# Patient Record
Sex: MALE | Race: WHITE | NOT HISPANIC OR LATINO | Employment: FULL TIME | URBAN - METROPOLITAN AREA
[De-identification: names, ages, dates, MRNs, and addresses within clinical notes are randomized per-mention and may not be internally consistent; named-entity substitution may affect disease eponyms.]

---

## 2018-07-20 ENCOUNTER — OFFICE VISIT (OUTPATIENT)
Dept: URGENT CARE | Facility: CLINIC | Age: 53
End: 2018-07-20
Payer: COMMERCIAL

## 2018-07-20 VITALS
SYSTOLIC BLOOD PRESSURE: 140 MMHG | TEMPERATURE: 97.8 F | HEIGHT: 71 IN | WEIGHT: 192.6 LBS | RESPIRATION RATE: 16 BRPM | DIASTOLIC BLOOD PRESSURE: 84 MMHG | OXYGEN SATURATION: 100 % | HEART RATE: 74 BPM | BODY MASS INDEX: 26.96 KG/M2

## 2018-07-20 DIAGNOSIS — H10.31 ACUTE BACTERIAL CONJUNCTIVITIS OF RIGHT EYE: Primary | ICD-10-CM

## 2018-07-20 PROCEDURE — 99213 OFFICE O/P EST LOW 20 MIN: CPT | Performed by: PHYSICIAN ASSISTANT

## 2018-07-20 RX ORDER — GLIPIZIDE 5 MG/1
TABLET, FILM COATED, EXTENDED RELEASE ORAL
COMMUNITY
Start: 2018-04-18

## 2018-07-20 RX ORDER — MOXIFLOXACIN 5 MG/ML
1 SOLUTION/ DROPS OPHTHALMIC 3 TIMES DAILY
Qty: 3 ML | Refills: 0 | Status: SHIPPED | OUTPATIENT
Start: 2018-07-20 | End: 2018-07-27

## 2018-07-20 RX ORDER — SITAGLIPTIN AND METFORMIN HYDROCHLORIDE 100; 1000 MG/1; MG/1
TABLET, FILM COATED, EXTENDED RELEASE ORAL
COMMUNITY
Start: 2018-07-18

## 2018-07-20 NOTE — PATIENT INSTRUCTIONS
Use eyedrops as directed  Avoid touching your eyes  You may apply a cool compress to your eye for discomfort  Wash your hands frequently  Follow-up with your family doctor in 3-5 days  Proceed to the ER if symptoms worsen  Conjunctivitis   WHAT YOU SHOULD KNOW:   Conjunctivitis, or pink eye, is inflammation of your conjunctiva  The conjunctiva is a thin tissue that covers the front of your eye and the back of your eyelids  The conjunctiva helps protect your eye and keep it moist         INSTRUCTIONS:   Medicines:   · Allergy medicine: This medicine helps decrease itchy, red, swollen eyes caused by allergies  It may be given as a pill, eye drops, or nasal spray  · Antibiotics:  You will need antibiotics if your conjunctivitis is caused by bacteria  This medicine may be given as eye drops or eye ointment  · Steroid medicine: This medicine helps decrease inflammation  It may be given as a pill, eye drops, or nasal spray  · Take your medicine as directed  Call your healthcare provider if you think your medicine is not helping or if you have side effects  Tell him if you are allergic to any medicine  Keep a list of the medicines, vitamins, and herbs you take  Include the amounts, and when and why you take them  Bring the list or the pill bottles to follow-up visits  Carry your medicine list with you in case of an emergency  Follow up with your primary healthcare provider as directed: You may need to return for more tests on your eyes  These will help your primary healthcare provider check for eye damage  Write down your questions so you remember to ask them during your visits  Avoid the spread of conjunctivitis:   · Wash your hands often:  Wash your hands before you touch your eyes  Also wash your hands before you prepare or eat food and after you use the bathroom or change a diaper  · Avoid allergens:  Try to avoid the things that cause your allergies, such as pets, dust, or grass       · Avoid contact:  Do not share towels or washcloths  Try to stay away from others as much as possible  Ask when you can return to work or school  · Throw away eye makeup:  Throw away mascara and other eye makeup  Manage your symptoms:  · Apply a cool compress:  Wet a washcloth with cold water and place it on your eye  This will help decrease swelling  · Use eye drops:  Eye drops, or artificial tears, can be bought without a doctor's order  They help keep your eye moist     · Do not wear contact lenses: They can irritate your eye  Throw away the pair you are using and ask when you can wear them again  Use a new pair of lenses when your primary healthcare provider says it is okay  · Flush your eye:  You may need to flush your eye with saline to help decrease your symptoms  Ask for more information on how to flush your eye  Contact your primary healthcare provider if:   · Your eyesight becomes blurry  · You have tiny bumps or spots of blood on your eye  · You have questions or concerns about your condition or care  Return to the emergency department if:   · The swelling in your eye gets worse, even after treatment  · Your vision suddenly becomes worse or you cannot see at all  · Your eye begins to bleed  © 2014 [address] is for End User's use only and may not be sold, redistributed or otherwise used for commercial purposes  All illustrations and images included in CareNotes® are the copyrighted property of [employer]  or Benjie Mercedes  The above information is an  only  It is not intended as medical advice for individual conditions or treatments  Talk to your doctor, nurse or pharmacist before following any medical regimen to see if it is safe and effective for you

## 2018-07-20 NOTE — PROGRESS NOTES
[facility]        NAME: Jacklyn Molina is a 48 y o  male  : 1965    MRN: [id number]  DATE: 2018  TIME: 11:07 AM    Assessment and Plan   Acute bacterial conjunctivitis of right eye [H10 31]  1  Acute bacterial conjunctivitis of right eye  moxifloxacin (VIGAMOX) 0 5 % ophthalmic solution     Patient Instructions   Use eyedrops as directed  Avoid touching your eyes  You may apply a cool compress to your eye for discomfort  Wash your hands frequently  Follow-up with your family doctor in 3-5 days  Proceed to the ER if symptoms worsen  Chief Complaint     Chief Complaint   Patient presents with    Conjunctivitis     rt eye began to hurt WEdnesday now red and irritated with exudate     History of Present Illness   63-year-old male presenting with complaint of right eye redness and irritation x 2 days  Symptoms associated with a sensation of foreign body of the right eye, intermittent watery drainage, and occular soreness  Soreness has somewhat improved today  He does not recall any trauma or event to cause foreign body of his eye  No change in his vision, difficulty or pain with eye movement, sensitivity to light, fevers or chills  He does wear reading glasses but no contact lenses  Denies any eye disorders  No sick contacts  Review of Systems   Review of Systems   Constitutional: Negative for chills and fever  HENT: Negative for congestion, ear pain, rhinorrhea and sore throat  Eyes: Negative for itching       Current Medications       Current Outpatient Prescriptions:     glipiZIDE (GLUCOTROL XL) 5 mg 24 hr tablet, , Disp: , Rfl:     JANUMET -1000 MG TB24, , Disp: , Rfl:     moxifloxacin (VIGAMOX) 0 5 % ophthalmic solution, Administer 1 drop to the right eye 3 (three) times a day for 7 days, Disp: 3 mL, Rfl: 0    Current Allergies     Allergies as of 2018    (No Known Allergies)            The following portions of the patient's history were reviewed and updated as appropriate: allergies, current medications, past family history, past medical history, past social history, past surgical history and problem list      Past Medical History:   Diagnosis Date    Diabetes mellitus ([address] )        Past Surgical History:   Procedure Laterality Date    ORTHOPEDIC SURGERY         Family History   Problem Relation Age of Onset    Diabetes Father          Medications have been verified  Objective   /84   Pulse 74   Temp 97 8 °F (36 6 °C)   Resp 16   Ht 5' 10 5" (1 791 m)   Wt 87 4 kg (192 lb 9 6 oz)   SpO2 100%   BMI 27 24 kg/m²        Physical Exam     Physical Exam   Constitutional: He is oriented to person, place, and time  He appears well-developed and well-nourished  No distress  HENT:   Head: Normocephalic and atraumatic  Eyes: EOM and lids are normal  Pupils are equal, round, and reactive to light  Lids are everted and swept, no foreign bodies found  Right eye exhibits no chemosis  Right conjunctiva is injected (bulbar and palpebral)  Right conjunctiva has no hemorrhage  Left conjunctiva is not injected  Left conjunctiva has no hemorrhage  Fundoscopic exam:       The right eye shows red reflex  The left eye shows red reflex  Slit lamp exam:       The right eye shows no corneal abrasion, no corneal flare, no corneal ulcer, no foreign body and no fluorescein uptake  Collection of white mucoid discharge in the right lower palpebral fold  Cardiovascular: Normal rate, regular rhythm and normal heart sounds  Pulmonary/Chest: Effort normal and breath sounds normal  No respiratory distress  He has no decreased breath sounds  He has no wheezes  He has no rhonchi  He has no rales  Neurological: He is alert and oriented to person, place, and time  No cranial nerve deficit  He exhibits normal muscle tone  Coordination normal    Skin: Skin is warm and dry  No rash noted  He is not diaphoretic  Psychiatric: He has a normal mood and affect  His behavior is normal    Nursing note and vitals reviewed

## 2018-11-19 ENCOUNTER — OFFICE VISIT (OUTPATIENT)
Dept: URGENT CARE | Facility: CLINIC | Age: 53
End: 2018-11-19
Payer: COMMERCIAL

## 2018-11-19 VITALS
HEIGHT: 72 IN | DIASTOLIC BLOOD PRESSURE: 98 MMHG | BODY MASS INDEX: 26.87 KG/M2 | TEMPERATURE: 97.8 F | HEART RATE: 89 BPM | SYSTOLIC BLOOD PRESSURE: 158 MMHG | OXYGEN SATURATION: 100 % | WEIGHT: 198.4 LBS | RESPIRATION RATE: 16 BRPM

## 2018-11-19 DIAGNOSIS — H15.002 SCLERITIS OF LEFT EYE: Primary | ICD-10-CM

## 2018-11-19 PROCEDURE — 99213 OFFICE O/P EST LOW 20 MIN: CPT | Performed by: PHYSICIAN ASSISTANT

## 2018-11-19 NOTE — PROGRESS NOTES
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]  1  Scleritis of left eye       Patient Instructions   Dr Love Mcnally office was called to check availability  They were able to see him immediately and thus patient was transferred from this office to ophthalmologist for further evaluation  Reviewed severity of current condition with patient  Discussed that will hold starting therapy at this time given immediate evaluation by specialist   Nonda Lacks that should Dr Pranay Coley become unavailable for any reason, he should go to the ER immediately  Reviewed that current condition left untreated could result in loss of vision, permanent change in vision, and other negative health consequences  He verbalized understanding of all information given and agreed to go directly to Dr Love Mcnally office from this office  All questions answered  Precautions given  DDX: scleritis, episcleritis, uveitis, etc    Chief Complaint     Chief Complaint   Patient presents with    Conjunctivitis     Pt here for concerns of an infection in his left eye, pt states it has  been x4 days, his eye  is red,  painful,  and his upper lid is swollen,  some  watering  No meds used  History of Present Illness       47 y/o male presenting with c/o left eye redness x 4 days  Patient notes symptoms began as a soreness and discomfort in the inner eye area and lower eyelid, described as sensation that something was in his eye  He notes that sensation was similar to that which he had previously with a stye  At onset he denies any redness or other associated sx, but notes this sensation decreased over time giving way to increasing redness, clear drainage, light sensitivity, eye pain, upper eyelid swelling, and mild discomfort with moving the eye   He denies any loss of ability to move his eye, change in visual field, floaters, flashing lights, or crusting  He states he has had eye redness in the past, seen in this office for it, and started on an antibiotic which resolved it  He notes this infection appears worse and is worse in severity  He sees Dr Kyle Domínguez on [address] in [place] as his eye doctor  He has had an eye exam this year, but has not seen him since moving  No eye trauma or known entry of a foreign body  No previous occurrence other than as stated  No sick contacts  Review of Systems   Review of Systems   Constitutional: Negative for chills, fatigue and fever  HENT: Negative for congestion, ear pain, postnasal drip, rhinorrhea and sore throat  Eyes: Negative for itching and visual disturbance  Respiratory: Negative for cough, shortness of breath and wheezing  Cardiovascular: Negative for chest pain and palpitations  Gastrointestinal: Negative for abdominal pain, diarrhea, nausea and vomiting  Musculoskeletal: Negative for arthralgias and myalgias  Skin: Negative for rash  Neurological: Negative for dizziness, weakness, light-headedness, numbness and headaches  Current Medications       Current Outpatient Prescriptions:     glipiZIDE (GLUCOTROL XL) 5 mg 24 hr tablet, , Disp: , Rfl:     JANUMET -1000 MG TB24, , Disp: , Rfl:     Current Allergies     Allergies as of 11/19/2018    (No Known Allergies)            The following portions of the patient's history were reviewed and updated as appropriate: allergies, current medications, past family history, past medical history, past social history, past surgical history and problem list      Past Medical History:   Diagnosis Date    Diabetes mellitus ([address] )        Past Surgical History:   Procedure Laterality Date    ORTHOPEDIC SURGERY         Family History   Problem Relation Age of Onset    Diabetes Father    Medications have been verified      Objective   /98 (BP Location: Right arm, Patient Position: Sitting, Cuff Size: Standard)   Pulse 89   Temp 97 8 °F (36 6 °C) (Tympanic)   Resp 16   Ht 6' (1 829 m)   Wt 90 kg (198 lb 6 4 oz)   SpO2 100%   BMI 26 91 kg/m²      Physical Exam     Physical Exam   Constitutional: He is oriented to person, place, and time  He appears well-developed and well-nourished  No distress  HENT:   Head: Normocephalic and atraumatic  Eyes: Pupils are equal, round, and reactive to light  EOM are normal  Right eye exhibits no exudate and no hordeolum  No foreign body present in the right eye  Left eye exhibits chemosis and discharge (clear, watery)  Left eye exhibits no exudate and no hordeolum  No foreign body present in the left eye  Right conjunctiva is injected  Right conjunctiva has no hemorrhage  No scleral icterus  Fundoscopic exam:       The right eye shows red reflex  The left eye shows red reflex  Slit lamp exam:       The right eye shows no hyphema and no hypopyon  The left eye shows an anterior chamber bulge  The left eye shows no corneal abrasion, no corneal flare, no corneal ulcer, no hyphema, no hypopyon and no fluorescein uptake  Left upper eyelid edema and mild erythema  No warmth  Cardiovascular: Normal rate, regular rhythm and normal heart sounds  Pulmonary/Chest: Effort normal and breath sounds normal  No respiratory distress  He has no decreased breath sounds  He has no wheezes  He has no rhonchi  He has no rales  Neurological: He is alert and oriented to person, place, and time  No cranial nerve deficit  He exhibits normal muscle tone  Coordination normal    Skin: Skin is warm and dry  No rash noted  He is not diaphoretic  Psychiatric: He has a normal mood and affect  His behavior is normal    Nursing note and vitals reviewed

## 2018-11-19 NOTE — PROGRESS NOTES
[facility]        NAME: Cyndi [place] is a 48 y o  male  : 1965    MRN: [id number]  DATE: 2018  TIME: 8:57 AM    Assessment and Plan   No primary diagnosis found  No diagnosis found  Patient Instructions       Follow up with PCP in 3-5 days  Proceed to  ER if symptoms worsen  Chief Complaint     Chief Complaint   Patient presents with    Conjunctivitis     Pt here for concerns of an infection in his left eye, pt states it has  been x4 days, his eye  is red,  painful,  and his upper lid is swollen,  some  watering  No meds used  History of Present Illness       Pain in lower lid that made him think he was getting a stye  It felt like "there was something in there"  Painful to blink  No difficulty with movement  Occasional lid pain with movement  Tearing but no cahgne in vision  Tearing  No pus or discoloration of tears  No crusting  Feels similar to infection from last time  Abx oworked  No sick contact  Review of Systems   Review of Systems   Constitutional: Negative for chills, fatigue and fever  HENT: Negative for congestion, ear pain, postnasal drip, rhinorrhea and sore throat  Eyes: Negative for photophobia, discharge, itching and visual disturbance  Respiratory: Negative for cough, shortness of breath and wheezing  Cardiovascular: Negative for chest pain and palpitations  Gastrointestinal: Negative for abdominal pain, diarrhea, nausea and vomiting  Musculoskeletal: Negative for arthralgias and myalgias  Skin: Negative for rash  Neurological: Negative for dizziness, weakness, light-headedness, numbness and headaches           Current Medications       Current Outpatient Prescriptions:     glipiZIDE (GLUCOTROL XL) 5 mg 24 hr tablet, , Disp: , Rfl:     JANUMET -1000 MG TB24, , Disp: , Rfl:     Current Allergies     Allergies as of 2018    (No Known Allergies)            The following portions of the patient's history were reviewed and updated as appropriate: allergies, current medications, past family history, past medical history, past social history, past surgical history and problem list      Past Medical History:   Diagnosis Date    Diabetes mellitus ([address] )        Past Surgical History:   Procedure Laterality Date    ORTHOPEDIC SURGERY         Family History   Problem Relation Age of Onset    Diabetes Father          Medications have been verified          Objective   /98 (BP Location: Right arm, Patient Position: Sitting, Cuff Size: Standard)   Pulse 89   Temp 97 8 °F (36 6 °C) (Tympanic)   Resp 16   Ht 6' (1 829 m)   Wt 90 kg (198 lb 6 4 oz)   SpO2 100%   BMI 26 91 kg/m²        Physical Exam     Physical Exam

## 2018-11-21 ENCOUNTER — TELEPHONE (OUTPATIENT)
Dept: URGENT CARE | Facility: CLINIC | Age: 53
End: 2018-11-21

## 2018-12-28 ENCOUNTER — OFFICE VISIT (OUTPATIENT)
Dept: URGENT CARE | Facility: CLINIC | Age: 53
End: 2018-12-28
Payer: COMMERCIAL

## 2018-12-28 VITALS
BODY MASS INDEX: 26.85 KG/M2 | HEART RATE: 99 BPM | OXYGEN SATURATION: 99 % | TEMPERATURE: 99.2 F | SYSTOLIC BLOOD PRESSURE: 118 MMHG | DIASTOLIC BLOOD PRESSURE: 88 MMHG | WEIGHT: 198 LBS | RESPIRATION RATE: 16 BRPM

## 2018-12-28 DIAGNOSIS — L03.031 PARONYCHIA OF GREAT TOE OF RIGHT FOOT: Primary | ICD-10-CM

## 2018-12-28 PROCEDURE — 99213 OFFICE O/P EST LOW 20 MIN: CPT | Performed by: FAMILY MEDICINE

## 2018-12-28 RX ORDER — PREDNISOLONE ACETATE 10 MG/ML
SUSPENSION/ DROPS OPHTHALMIC
Refills: 0 | COMMUNITY
Start: 2018-11-19

## 2018-12-28 RX ORDER — AMOXICILLIN AND CLAVULANATE POTASSIUM 875; 125 MG/1; MG/1
1 TABLET, FILM COATED ORAL EVERY 12 HOURS SCHEDULED
Qty: 14 TABLET | Refills: 0 | Status: SHIPPED | OUTPATIENT
Start: 2018-12-28 | End: 2019-01-04

## 2018-12-28 NOTE — PROGRESS NOTES
[facility]        NAME: Valentina Flores is a 48 y o  male  : 1965    MRN: [id number]  DATE: 2018  TIME: 5:24 PM    Assessment and Plan   Paronychia of great toe of right foot [L03 031]  1  Paronychia of great toe of right foot  amoxicillin-clavulanate (AUGMENTIN) 875-125 mg per tablet    Ambulatory referral to Podiatry         Patient Instructions     Patient Instructions   1  Acute paronychia of right big toe   - Augmentin prescribed, complete as directed   - keep the toe clean and dry   - take Tylenol or Motrin as needed   - apply ice to the site   - follow up w/ PCP for re-check in 3-5 days   - referral provided to podiatry   - if symptoms persist despite treatment, worsen, or any new symptoms present, should be seen in the ER     Follow up with PCP in 3-5 days  Proceed to  ER if symptoms worsen  Chief Complaint     Chief Complaint   Patient presents with    Toe Pain     right big toe redness noted around the cuticle site         History of Present Illness       49 yo male presents c/o R big toe pain  He states [place] the city [place] and had multiple step on his feet  When he got home he realized his right big toe was painful and the skin surrounding the toe nail appeared red and swollen  He denies any bruising  No numbness/tingling  He denies any cuts or open wounds  No bleeding or drainage  No fever  Nail is intact  He has not attempted any treatment for his symptoms  He is concerned bc he is a diabetic  Review of Systems   Review of Systems   Constitutional: Negative  Musculoskeletal: Negative  Skin:        As noted in HPI   Neurological: Negative            Current Medications       Current Outpatient Prescriptions:     glipiZIDE (GLUCOTROL XL) 5 mg 24 hr tablet, , Disp: , Rfl:     JANUMET -1000 MG TB24, , Disp: , Rfl:     prednisoLONE acetate (PRED FORTE) 1 % ophthalmic suspension, PLACE 1 DROP INTO LEFT EYE EVERY 3 HOURS, Disp: , Rfl: 0    amoxicillin-clavulanate (AUGMENTIN) 875-125 mg per tablet, Take 1 tablet by mouth every 12 (twelve) hours for 7 days, Disp: 14 tablet, Rfl: 0    Current Allergies     Allergies as of 12/28/2018    (No Known Allergies)            The following portions of the patient's history were reviewed and updated as appropriate: allergies, current medications, past family history, past medical history, past social history, past surgical history and problem list      Past Medical History:   Diagnosis Date    Diabetes mellitus ([facility] [address] )        Past Surgical History:   Procedure Laterality Date    KNEE SURGERY      ORTHOPEDIC SURGERY         Family History   Problem Relation Age of Onset    Diabetes Father          Medications have been verified  Objective   /88   Pulse 99   Temp 99 2 °F (37 3 °C)   Resp 16   Wt 89 8 kg (198 lb)   SpO2 99%   BMI 26 85 kg/m²        Physical Exam     Physical Exam   Constitutional: He is oriented to person, place, and time  Vital signs are normal  He appears well-developed and well-nourished  He is active and cooperative  Non-toxic appearance  He does not have a sickly appearance  He does not appear ill  No distress  Musculoskeletal: Normal range of motion  He exhibits no edema, tenderness or deformity  Neurological: He is alert and oriented to person, place, and time  Skin: He is not diaphoretic  There is swelling, erythema, and tenderness of the skin surrounding the right big toe  No cuts or open wounds, skin is intact  No bruising  Nail appears to be infected with a fungus, but is intact  Good capillary refill  Sensations intact  Psychiatric: He has a normal mood and affect  His behavior is normal  Judgment and thought content normal    Nursing note and vitals reviewed

## 2018-12-28 NOTE — PATIENT INSTRUCTIONS
1  Acute paronychia of right big toe   - Augmentin prescribed, complete as directed   - keep the toe clean and dry   - take Tylenol or Motrin as needed   - apply ice to the site   - follow up w/ PCP for re-check in 3-5 days   - referral provided to podiatry   - if symptoms persist despite treatment, worsen, or any new symptoms present, should be seen in the ER

## 2021-04-14 ENCOUNTER — NURSE TRIAGE (OUTPATIENT)
Dept: OTHER | Facility: OTHER | Age: 56
End: 2021-04-14

## 2021-04-14 DIAGNOSIS — Z20.828 SARS-ASSOCIATED CORONAVIRUS EXPOSURE: Primary | ICD-10-CM

## 2021-04-14 NOTE — TELEPHONE ENCOUNTER
Regarding: COVID test- request - exposure 1/4  ----- Message from Baltazar Pruitt sent at 4/14/2021  4:09 PM EDT -----  COVID test request- asymptomatic - exposure "A family member in our household tested positive for COVID today "

## 2021-04-14 NOTE — TELEPHONE ENCOUNTER
Were you within 6 feet or less, for up to 15 minutes or more with a person that has a confirmed COVID-19 test?       yes         What was the date of your exposure?         Lives with son who tested positive         Are you experiencing any symptoms attributed to the virus?  (Assess for SOB, cough, fever, difficulty breathing)        denies         HIGH RISK: Do you have any history heart or lung conditions, weakened immune system, diabetes, Asthma, CHF, HIV, COPD, Chemo, renal failure, sickle cell, etc?        Denies

## 2021-04-16 DIAGNOSIS — Z20.828 SARS-ASSOCIATED CORONAVIRUS EXPOSURE: ICD-10-CM

## 2021-04-16 PROCEDURE — U0003 INFECTIOUS AGENT DETECTION BY NUCLEIC ACID (DNA OR RNA); SEVERE ACUTE RESPIRATORY SYNDROME CORONAVIRUS 2 (SARS-COV-2) (CORONAVIRUS DISEASE [COVID-19]), AMPLIFIED PROBE TECHNIQUE, MAKING USE OF HIGH THROUGHPUT TECHNOLOGIES AS DESCRIBED BY CMS-2020-01-R: HCPCS | Performed by: FAMILY MEDICINE

## 2021-04-16 PROCEDURE — U0005 INFEC AGEN DETEC AMPLI PROBE: HCPCS | Performed by: FAMILY MEDICINE

## 2021-04-17 LAB — SARS-COV-2 RNA RESP QL NAA+PROBE: NEGATIVE
